# Patient Record
(demographics unavailable — no encounter records)

---

## 2024-10-07 NOTE — HISTORY OF PRESENT ILLNESS
[FreeTextEntry1] : f/u shoulder pain/ IFG / elevated LFT's  [de-identified] : Pt with h/o elevated Hct  elevated LFT's  Vit D def IFG shoulder pain has resolved  here for f/u  no complaints

## 2024-10-07 NOTE — PLAN
[FreeTextEntry1] : A/P:  Elevated LFT's :  Advised to avoid NSAIDS, alcohol and Tylenol. ? Fatty liver, to diane labs as ordered and f/u - might need further w/u . Daily regular exercise and to loose weight. IFG: ADA diet / reg ex / diane 3 mo  to repeat hct ? dehydration  Vit D def: to diane, c/w otc replacement